# Patient Record
Sex: FEMALE | Race: WHITE | NOT HISPANIC OR LATINO | Employment: OTHER | URBAN - METROPOLITAN AREA
[De-identification: names, ages, dates, MRNs, and addresses within clinical notes are randomized per-mention and may not be internally consistent; named-entity substitution may affect disease eponyms.]

---

## 2017-08-29 ENCOUNTER — LAB (OUTPATIENT)
Dept: LAB | Facility: HOSPITAL | Age: 57
End: 2017-08-29

## 2017-08-29 ENCOUNTER — TRANSCRIBE ORDERS (OUTPATIENT)
Dept: ADMINISTRATIVE | Facility: HOSPITAL | Age: 57
End: 2017-08-29

## 2017-08-29 DIAGNOSIS — A49.02 INFECTION WITH METHICILLIN-RESISTANT STAPHYLOCOCCUS AUREUS (MRSA): Primary | ICD-10-CM

## 2017-08-29 DIAGNOSIS — Z01.818 PRE-OP TESTING: ICD-10-CM

## 2017-08-29 LAB
ALBUMIN SERPL-MCNC: 4 G/DL (ref 3.5–5.2)
ALBUMIN/GLOB SERPL: 1.3 G/DL
ALP SERPL-CCNC: 238 U/L (ref 39–117)
ALT SERPL W P-5'-P-CCNC: 149 U/L (ref 1–33)
ANION GAP SERPL CALCULATED.3IONS-SCNC: 11.3 MMOL/L
AST SERPL-CCNC: 163 U/L (ref 1–32)
BASOPHILS # BLD AUTO: 0.01 10*3/MM3 (ref 0–0.2)
BASOPHILS NFR BLD AUTO: 0.3 % (ref 0–1.5)
BILIRUB SERPL-MCNC: 4.4 MG/DL (ref 0.1–1.2)
BUN BLD-MCNC: 19 MG/DL (ref 6–20)
BUN/CREAT SERPL: 17.9 (ref 7–25)
CALCIUM SPEC-SCNC: 9.4 MG/DL (ref 8.6–10.5)
CHLORIDE SERPL-SCNC: 100 MMOL/L (ref 98–107)
CO2 SERPL-SCNC: 27.7 MMOL/L (ref 22–29)
CREAT BLD-MCNC: 1.06 MG/DL (ref 0.57–1)
DEPRECATED RDW RBC AUTO: 67.7 FL (ref 37–54)
EOSINOPHIL # BLD AUTO: 0.03 10*3/MM3 (ref 0–0.7)
EOSINOPHIL NFR BLD AUTO: 0.9 % (ref 0.3–6.2)
ERYTHROCYTE [DISTWIDTH] IN BLOOD BY AUTOMATED COUNT: 18.9 % (ref 11.7–13)
GFR SERPL CREATININE-BSD FRML MDRD: 54 ML/MIN/1.73
GLOBULIN UR ELPH-MCNC: 3.1 GM/DL
GLUCOSE BLD-MCNC: 144 MG/DL (ref 65–99)
HCT VFR BLD AUTO: 31.5 % (ref 35.6–45.5)
HGB BLD-MCNC: 10 G/DL (ref 11.9–15.5)
IGG1 SER-MCNC: 1087 MG/DL (ref 700–1600)
IMM GRANULOCYTES # BLD: 0 10*3/MM3 (ref 0–0.03)
IMM GRANULOCYTES NFR BLD: 0 % (ref 0–0.5)
INR PPP: 1.05 (ref 0.9–1.1)
LYMPHOCYTES # BLD AUTO: 1.33 10*3/MM3 (ref 0.9–4.8)
LYMPHOCYTES NFR BLD AUTO: 38.8 % (ref 19.6–45.3)
MCH RBC QN AUTO: 31.4 PG (ref 26.9–32)
MCHC RBC AUTO-ENTMCNC: 31.7 G/DL (ref 32.4–36.3)
MCV RBC AUTO: 99.1 FL (ref 80.5–98.2)
MONOCYTES # BLD AUTO: 0.36 10*3/MM3 (ref 0.2–1.2)
MONOCYTES NFR BLD AUTO: 10.5 % (ref 5–12)
NEUTROPHILS # BLD AUTO: 1.7 10*3/MM3 (ref 1.9–8.1)
NEUTROPHILS NFR BLD AUTO: 49.5 % (ref 42.7–76)
PLATELET # BLD AUTO: 147 10*3/MM3 (ref 140–500)
PMV BLD AUTO: 10 FL (ref 6–12)
POTASSIUM BLD-SCNC: 3.9 MMOL/L (ref 3.5–5.2)
PROT SERPL-MCNC: 7.1 G/DL (ref 6–8.5)
PROTHROMBIN TIME: 13.3 SECONDS (ref 11.7–14.2)
RBC # BLD AUTO: 3.18 10*6/MM3 (ref 3.9–5.2)
SODIUM BLD-SCNC: 139 MMOL/L (ref 136–145)
WBC NRBC COR # BLD: 3.43 10*3/MM3 (ref 4.5–10.7)

## 2017-08-29 PROCEDURE — 85025 COMPLETE CBC W/AUTO DIFF WBC: CPT

## 2017-08-29 PROCEDURE — 36415 COLL VENOUS BLD VENIPUNCTURE: CPT

## 2017-08-29 PROCEDURE — 80053 COMPREHEN METABOLIC PANEL: CPT

## 2017-08-29 PROCEDURE — 80158 DRUG ASSAY CYCLOSPORINE: CPT

## 2017-08-29 PROCEDURE — 85610 PROTHROMBIN TIME: CPT

## 2017-08-30 LAB — CYCLOSPORINE BLD LC/MS/MS-MCNC: NORMAL UG/L

## 2024-07-31 ENCOUNTER — DOCUMENTATION (OUTPATIENT)
Dept: FAMILY MEDICINE CLINIC | Facility: CLINIC | Age: 64
End: 2024-07-31
Payer: MEDICARE

## 2024-07-31 NOTE — PROGRESS NOTES
Nursing Home History and Physical       Mike Garcia DO  [address] [place] [address] Phone: [phone]  Fax: [phone]     PATIENT NAME: Cinthia Gerard                                                                          YOB: 1960           DATE OF SERVICE: 2024  FACILITY:  [facility]    CHIEF COMPLAINT:  Nursing facility admission      History of Present Illness  The patient is a 63-year-old female with a history of autoimmune hepatitis status post transplant, osteoporosis, atrial fibrillation, and recent diarrhea. She was admitted to [facility] for concerns of atrial fibrillation with rapid ventricular response (RVR), hypotension due to MRSA bacteremia of uncertain source.    She required an extensive hospitalization, including an ICU stay requiring pressors. She was on broad-spectrum antibiotics, but this was switched to daptomycin and ceftaroline. An extensive workup was conducted in the hospital to identify the source of her bacteremia, but no specific source was identified. Infectious Disease Services recommended an extended period of IV antibiotics with daptomycin to be continued until 2024.       PAST MEDICAL & SURGICAL HISTORY:   Past Medical History:   Diagnosis Date    Acute rejection of liver transplant     GWENDOLYN (acute kidney injury)     Anemia     Anxiety     Atrial fibrillation     Atrial flutter     Autoimmune hepatitis     Avascular necrosis     Bacteremia     Cirrhosis     Closed right hip fracture, with routine healing, subsequent encounter     Diabetes mellitus     Elevated LFTs     Endometriosis     GERD (gastroesophageal reflux disease)     Hepatocellular injury     History of spontaneous      HSV-1 (herpes simplex virus 1) infection     Hypertension     Hypotension     Jaundice     Liver transplant recipient     Low back pain     Mixed hyperlipidemia     MRSA (methicillin resistant Staphylococcus aureus)     Osteopenia      Osteoporosis     Pain in unspecified hip     Pancytopenia     Personal history of diseases of the blood and blood-forming organs and certain disorders involving the immune mechanism     Primary osteoarthritis of both knees     Sacral wounds, chronic     Septic shock     Severe obesity     Shock     Spondylosis of lumbar spine     Steroid-induced avascular necrosis of right shoulder     Subclinical hypothyroidism     Urinary tract infection       Past Surgical History:   Procedure Laterality Date    ABLATION OF DYSRHYTHMIC FOCUS      CARDIAC PACEMAKER PLACEMENT      CENTRAL VENOUS LINE INSERTION  07/2024    CHOLECYSTECTOMY      HIP FRACTURE SURGERY      HYSTERECTOMY      INTRAOCULAR LENS INSERTION      LIVER TRANSPLANTATION  2014    TONSILLECTOMY      TOTAL HIP ARTHROPLASTY Right          MEDICATIONS:  I have reviewed and reconciled the patients medication list in the patients chart at the [facility] nursing [facility] on 08/01/2024.      ALLERGIES:  Allergies   Allergen Reactions    Ampicillin Unknown - High Severity     RASH    Erythromycin Unknown - High Severity     RASH    Gadolinium Derivatives Unknown - High Severity    Latex Unknown - High Severity    Metronidazole Unknown - High Severity     RASH    Tylenol [Acetaminophen] Unknown - High Severity     LIVER ISSUES         SOCIAL HISTORY:  Social History     Socioeconomic History    Marital status:    Tobacco Use    Smoking status: Never    Smokeless tobacco: Never   Vaping Use    Vaping status: Never Used   Substance and Sexual Activity    Drug use: Not Currently    Sexual activity: Defer       FAMILY HISTORY:  Family History   Problem Relation Age of Onset    Arthritis Mother     Hyperlipidemia Mother     Hypertension Mother     Obesity Mother     Osteoarthritis Mother     Cancer Father     Hyperlipidemia Father     Diabetes Father     Skin cancer Father     Atrial fibrillation Father     Obesity Sister     Skin cancer Sister     Cancer Sister      Hypertension Sister     Diabetes Sister     Allergies Daughter     Asthma Daughter     Allergies Son     Asthma Son     Anesthesia problems Other     Malig Hyperthermia Other         REVIEW OF SYSTEMS:  Review of Systems   Constitutional:  Negative for chills, fatigue and fever.   HENT:  Negative for congestion, ear pain, rhinorrhea, sinus pressure and sore throat.    Eyes:  Negative for visual disturbance.   Respiratory:  Negative for cough, chest tightness, shortness of breath and wheezing.    Cardiovascular:  Negative for chest pain, palpitations and leg swelling.   Gastrointestinal:  Negative for abdominal pain, blood in stool, constipation, diarrhea, nausea and vomiting.   Endocrine: Negative for polydipsia and polyuria.   Genitourinary:  Negative for dysuria and hematuria.   Musculoskeletal:  Negative for arthralgias and back pain.   Skin:  Negative for rash.   Neurological:  Negative for dizziness, light-headedness, numbness and headaches.   Psychiatric/Behavioral:  Negative for dysphoric mood and sleep disturbance. The patient is not nervous/anxious.          PHYSICAL EXAMINATION:   VITAL SIGNS: /76   Pulse 72   Temp 96.9 °F (36.1 °C)   Resp 18   SpO2 93%     Physical Exam  Vitals and nursing note reviewed.   Constitutional:       Appearance: Normal appearance. She is well-developed.   HENT:      Head: Normocephalic and atraumatic.      Nose: Nose normal.      Mouth/Throat:      Mouth: Mucous membranes are moist.      Pharynx: No oropharyngeal exudate.   Eyes:      General: No scleral icterus.     Extraocular Movements: Extraocular movements intact.      Conjunctiva/sclera: Conjunctivae normal.      Pupils: Pupils are equal, round, and reactive to light.   Neck:      Thyroid: No thyromegaly.   Cardiovascular:      Rate and Rhythm: Normal rate. Rhythm irregular.      Heart sounds: Normal heart sounds. No murmur heard.     No friction rub. No gallop.   Pulmonary:      Effort: Pulmonary effort is  normal. No respiratory distress.      Breath sounds: Normal breath sounds. No wheezing.   Abdominal:      General: Bowel sounds are normal. There is no distension.      Palpations: Abdomen is soft.      Tenderness: There is no abdominal tenderness.   Musculoskeletal:         General: No deformity or signs of injury.      Cervical back: Normal range of motion and neck supple.   Lymphadenopathy:      Cervical: No cervical adenopathy.   Skin:     General: Skin is warm and dry.      Findings: No rash.   Neurological:      General: No focal deficit present.      Mental Status: She is alert and oriented to person, place, and time.   Psychiatric:         Mood and Affect: Mood normal.         Behavior: Behavior normal.         RECORDS REVIEW:   Discharge Summary [facility] 7/31/2024    ASSESSMENT   Diagnoses and all orders for this visit:    1. Other cirrhosis of liver (Primary)    2. Liver transplanted    3. Constipation, unspecified constipation type    4. Chronic atrial fibrillation    5. HSV-1 infection    6. Acute kidney injury    7. Wound of sacral region, subsequent encounter    8. Type 2 diabetes mellitus without complication, with long-term current use of insulin    9. Anemia of chronic disease    10. Class 1 obesity due to excess calories with serious comorbidity and body mass index (BMI) of 30.0 to 30.9 in adult        PLAN  Assessment & Plan  1. 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  The patient has known associated transaminitis, hyperbilirubinemia, shari, anemia, pancytopenia, and neutropenia. The patient was not considered to be a candidate for another re-transplant. Prednisolone was continued and neutropenic precautions were advised and removed by 07/31/2024.    2. Constipation and diarrhea.  The patient was having alternating symptoms but seems to be stable on a bowel regimen at this time.    3. Atrial fibrillation.  This is stable. The patient initially required  amiodarone drip for concerns of atrial fibrillation with RVR. This was discontinued and the patient was continued on a regimen of metoprolol and Eliquis.    4. HSV 1 viremia.  The patient has completed 7 days of valacyclovir during hospitalization.    5. Acute kidney injury with baseline CKD stage 4.  Baseline creatinine of 1 upon admission to the hospital, creatinine was as high as 3.57.    6. Chronic sacral wounds.  Continue wound care in nursing facility.    7. Type 2 diabetes mellitus.  Most recent A1c of 7.5. Continue current insulin regimen with insulin glargine 10 units b.i.d. and insulin lispro 8 units three times a day with meals.    8. Chronic anemia.  Hemoglobin upon discharge from hospital was 7.8.    9. Obesity.  Complicates aspects of care and recovery time in nursing facility.       [x]  Discussed Patient in detail with nursing/staff, addressed all needs today.     [x]  Plan of Care Reviewed   [x]  PT/OT Reviewed   [x]  Order Changes  []  Discharge Plans Reviewed  [x]  Advance Directive on file with Nursing Home.   [x]  POA on file with Nursing Home.    [x]  Code Status listed and reviewed.     Mike Garcia DO.  9/11/2024      **Part of this note may be an electronic transcription/translation of spoken language to printed text using the Dragon Dictation System.**    Patient or patient representative verbalized consent for the use of Ambient Listening during the visit with  Mike Garcia DO for chart documentation. 9/11/2024  08:39 EDT

## 2024-08-01 ENCOUNTER — NURSING HOME (OUTPATIENT)
Dept: INTERNAL MEDICINE | Facility: CLINIC | Age: 64
End: 2024-08-01
Payer: MEDICARE

## 2024-08-01 DIAGNOSIS — D63.8 ANEMIA OF CHRONIC DISEASE: ICD-10-CM

## 2024-08-01 DIAGNOSIS — S31.000D WOUND OF SACRAL REGION, SUBSEQUENT ENCOUNTER: ICD-10-CM

## 2024-08-01 DIAGNOSIS — E11.9 TYPE 2 DIABETES MELLITUS WITHOUT COMPLICATION, WITH LONG-TERM CURRENT USE OF INSULIN: ICD-10-CM

## 2024-08-01 DIAGNOSIS — N17.9 ACUTE KIDNEY INJURY: ICD-10-CM

## 2024-08-01 DIAGNOSIS — E66.09 CLASS 1 OBESITY DUE TO EXCESS CALORIES WITH SERIOUS COMORBIDITY AND BODY MASS INDEX (BMI) OF 30.0 TO 30.9 IN ADULT: ICD-10-CM

## 2024-08-01 DIAGNOSIS — K74.69 OTHER CIRRHOSIS OF LIVER: Primary | ICD-10-CM

## 2024-08-01 DIAGNOSIS — K59.00 CONSTIPATION, UNSPECIFIED CONSTIPATION TYPE: ICD-10-CM

## 2024-08-01 DIAGNOSIS — Z94.4 LIVER TRANSPLANTED: ICD-10-CM

## 2024-08-01 DIAGNOSIS — I48.20 CHRONIC ATRIAL FIBRILLATION: ICD-10-CM

## 2024-08-01 DIAGNOSIS — B00.9 HSV-1 INFECTION: ICD-10-CM

## 2024-08-01 DIAGNOSIS — Z79.4 TYPE 2 DIABETES MELLITUS WITHOUT COMPLICATION, WITH LONG-TERM CURRENT USE OF INSULIN: ICD-10-CM

## 2024-08-01 PROCEDURE — 99305 1ST NF CARE MODERATE MDM 35: CPT | Performed by: INTERNAL MEDICINE

## 2024-08-01 RX ORDER — SODIUM CHLORIDE 0.9 % (FLUSH) 0.9 %
3 SYRINGE (ML) INJECTION
COMMUNITY

## 2024-08-01 RX ORDER — METHOCARBAMOL 500 MG/1
500 TABLET, FILM COATED ORAL 2 TIMES DAILY
COMMUNITY

## 2024-08-01 RX ORDER — CYCLOSPORINE 25 MG/1
50 CAPSULE, GELATIN COATED ORAL 2 TIMES DAILY
COMMUNITY

## 2024-08-01 RX ORDER — MYCOPHENOLIC ACID 180 MG/1
180 TABLET, DELAYED RELEASE ORAL 2 TIMES DAILY
COMMUNITY

## 2024-08-01 RX ORDER — METOPROLOL TARTRATE 100 MG/1
100 TABLET ORAL 2 TIMES DAILY
COMMUNITY

## 2024-08-01 RX ORDER — INSULIN GLARGINE 100 [IU]/ML
7 INJECTION, SOLUTION SUBCUTANEOUS DAILY
COMMUNITY

## 2024-08-01 RX ORDER — CALCIUM CARBONATE 300MG(750)
1 TABLET,CHEWABLE ORAL
COMMUNITY

## 2024-08-01 RX ORDER — OXYCODONE HYDROCHLORIDE 5 MG/1
5 TABLET ORAL EVERY 6 HOURS PRN
COMMUNITY

## 2024-08-01 RX ORDER — PANTOPRAZOLE SODIUM 40 MG/1
40 TABLET, DELAYED RELEASE ORAL DAILY
COMMUNITY

## 2024-08-01 RX ORDER — NYSTATIN 100000 [USP'U]/G
1 POWDER TOPICAL 2 TIMES DAILY PRN
COMMUNITY

## 2024-08-01 RX ORDER — ONDANSETRON 4 MG/1
4 TABLET, FILM COATED ORAL EVERY 6 HOURS PRN
COMMUNITY

## 2024-08-01 RX ORDER — URSODIOL 300 MG/1
300 CAPSULE ORAL 2 TIMES DAILY
COMMUNITY

## 2024-08-01 RX ORDER — CETIRIZINE HYDROCHLORIDE 10 MG/1
10 TABLET ORAL DAILY
COMMUNITY

## 2024-08-01 RX ORDER — COLLAGENASE SANTYL 250 [ARB'U]/G
1 OINTMENT TOPICAL DAILY
COMMUNITY

## 2024-08-01 NOTE — LETTER
Nursing Home History and Physical       Mike Garcia DO  [address] [place] [address] Phone: [phone]  Fax: [phone]     PATIENT NAME: Cinthia Gerard                                                                          YOB: 1960           DATE OF SERVICE: 2024  FACILITY:  [facility]    CHIEF COMPLAINT:  Nursing facility admission      History of Present Illness  The patient is a 63-year-old female with a history of autoimmune hepatitis status post transplant, osteoporosis, atrial fibrillation, and recent diarrhea. She was admitted to [facility] for concerns of atrial fibrillation with rapid ventricular response (RVR), hypotension due to MRSA bacteremia of uncertain source.    She required an extensive hospitalization, including an ICU stay requiring pressors. She was on broad-spectrum antibiotics, but this was switched to daptomycin and ceftaroline. An extensive workup was conducted in the hospital to identify the source of her bacteremia, but no specific source was identified. Infectious Disease Services recommended an extended period of IV antibiotics with daptomycin to be continued until 2024.       PAST MEDICAL & SURGICAL HISTORY:   Past Medical History:   Diagnosis Date    Acute rejection of liver transplant     GWENDOLYN (acute kidney injury)     Anemia     Anxiety     Atrial fibrillation     Atrial flutter     Autoimmune hepatitis     Avascular necrosis     Bacteremia     Cirrhosis     Closed right hip fracture, with routine healing, subsequent encounter     Diabetes mellitus     Elevated LFTs     Endometriosis     GERD (gastroesophageal reflux disease)     Hepatocellular injury     History of spontaneous      HSV-1 (herpes simplex virus 1) infection     Hypertension     Hypotension     Jaundice     Liver transplant recipient     Low back pain     Mixed hyperlipidemia     MRSA (methicillin resistant Staphylococcus aureus)     Osteopenia      Osteoporosis     Pain in unspecified hip     Pancytopenia     Personal history of diseases of the blood and blood-forming organs and certain disorders involving the immune mechanism     Primary osteoarthritis of both knees     Sacral wounds, chronic     Septic shock     Severe obesity     Shock     Spondylosis of lumbar spine     Steroid-induced avascular necrosis of right shoulder     Subclinical hypothyroidism     Urinary tract infection       Past Surgical History:   Procedure Laterality Date    ABLATION OF DYSRHYTHMIC FOCUS      CARDIAC PACEMAKER PLACEMENT      CENTRAL VENOUS LINE INSERTION  07/2024    CHOLECYSTECTOMY      HIP FRACTURE SURGERY      HYSTERECTOMY      INTRAOCULAR LENS INSERTION      LIVER TRANSPLANTATION  2014    TONSILLECTOMY      TOTAL HIP ARTHROPLASTY Right          MEDICATIONS:  I have reviewed and reconciled the patients medication list in the patients chart at the [facility] nursing [facility] on 08/01/2024.      ALLERGIES:  Allergies   Allergen Reactions    Ampicillin Unknown - High Severity     RASH    Erythromycin Unknown - High Severity     RASH    Gadolinium Derivatives Unknown - High Severity    Latex Unknown - High Severity    Metronidazole Unknown - High Severity     RASH    Tylenol [Acetaminophen] Unknown - High Severity     LIVER ISSUES         SOCIAL HISTORY:  Social History     Socioeconomic History    Marital status:    Tobacco Use    Smoking status: Never    Smokeless tobacco: Never   Vaping Use    Vaping status: Never Used   Substance and Sexual Activity    Drug use: Not Currently    Sexual activity: Defer       FAMILY HISTORY:  Family History   Problem Relation Age of Onset    Arthritis Mother     Hyperlipidemia Mother     Hypertension Mother     Obesity Mother     Osteoarthritis Mother     Cancer Father     Hyperlipidemia Father     Diabetes Father     Skin cancer Father     Atrial fibrillation Father     Obesity Sister     Skin cancer Sister     Cancer Sister      Hypertension Sister     Diabetes Sister     Allergies Daughter     Asthma Daughter     Allergies Son     Asthma Son     Anesthesia problems Other     Malig Hyperthermia Other         REVIEW OF SYSTEMS:  Review of Systems   Constitutional:  Negative for chills, fatigue and fever.   HENT:  Negative for congestion, ear pain, rhinorrhea, sinus pressure and sore throat.    Eyes:  Negative for visual disturbance.   Respiratory:  Negative for cough, chest tightness, shortness of breath and wheezing.    Cardiovascular:  Negative for chest pain, palpitations and leg swelling.   Gastrointestinal:  Negative for abdominal pain, blood in stool, constipation, diarrhea, nausea and vomiting.   Endocrine: Negative for polydipsia and polyuria.   Genitourinary:  Negative for dysuria and hematuria.   Musculoskeletal:  Negative for arthralgias and back pain.   Skin:  Negative for rash.   Neurological:  Negative for dizziness, light-headedness, numbness and headaches.   Psychiatric/Behavioral:  Negative for dysphoric mood and sleep disturbance. The patient is not nervous/anxious.          PHYSICAL EXAMINATION:   VITAL SIGNS: /76   Pulse 72   Temp 96.9 °F (36.1 °C)   Resp 18   SpO2 93%     Physical Exam  Vitals and nursing note reviewed.   Constitutional:       Appearance: Normal appearance. She is well-developed.   HENT:      Head: Normocephalic and atraumatic.      Nose: Nose normal.      Mouth/Throat:      Mouth: Mucous membranes are moist.      Pharynx: No oropharyngeal exudate.   Eyes:      General: No scleral icterus.     Extraocular Movements: Extraocular movements intact.      Conjunctiva/sclera: Conjunctivae normal.      Pupils: Pupils are equal, round, and reactive to light.   Neck:      Thyroid: No thyromegaly.   Cardiovascular:      Rate and Rhythm: Normal rate. Rhythm irregular.      Heart sounds: Normal heart sounds. No murmur heard.     No friction rub. No gallop.   Pulmonary:      Effort: Pulmonary effort is  normal. No respiratory distress.      Breath sounds: Normal breath sounds. No wheezing.   Abdominal:      General: Bowel sounds are normal. There is no distension.      Palpations: Abdomen is soft.      Tenderness: There is no abdominal tenderness.   Musculoskeletal:         General: No deformity or signs of injury.      Cervical back: Normal range of motion and neck supple.   Lymphadenopathy:      Cervical: No cervical adenopathy.   Skin:     General: Skin is warm and dry.      Findings: No rash.   Neurological:      General: No focal deficit present.      Mental Status: She is alert and oriented to person, place, and time.   Psychiatric:         Mood and Affect: Mood normal.         Behavior: Behavior normal.         RECORDS REVIEW:   Discharge Summary [facility] 7/31/2024    ASSESSMENT   Diagnoses and all orders for this visit:    1. Other cirrhosis of liver (Primary)    2. Liver transplanted    3. Constipation, unspecified constipation type    4. Chronic atrial fibrillation    5. HSV-1 infection    6. Acute kidney injury    7. Wound of sacral region, subsequent encounter    8. Type 2 diabetes mellitus without complication, with long-term current use of insulin    9. Anemia of chronic disease    10. Class 1 obesity due to excess calories with serious comorbidity and body mass index (BMI) of 30.0 to 30.9 in adult        PLAN  Assessment & Plan  1. 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  The patient has known associated transaminitis, hyperbilirubinemia, shari, anemia, pancytopenia, and neutropenia. The patient was not considered to be a candidate for another re-transplant. Prednisolone was continued and neutropenic precautions were advised and removed by 07/31/2024.    2. Constipation and diarrhea.  The patient was having alternating symptoms but seems to be stable on a bowel regimen at this time.    3. Atrial fibrillation.  This is stable. The patient initially required  amiodarone drip for concerns of atrial fibrillation with RVR. This was discontinued and the patient was continued on a regimen of metoprolol and Eliquis.    4. HSV 1 viremia.  The patient has completed 7 days of valacyclovir during hospitalization.    5. Acute kidney injury with baseline CKD stage 4.  Baseline creatinine of 1 upon admission to the hospital, creatinine was as high as 3.57.    6. Chronic sacral wounds.  Continue wound care in nursing facility.    7. Type 2 diabetes mellitus.  Most recent A1c of 7.5. Continue current insulin regimen with insulin glargine 10 units b.i.d. and insulin lispro 8 units three times a day with meals.    8. Chronic anemia.  Hemoglobin upon discharge from hospital was 7.8.    9. Obesity.  Complicates aspects of care and recovery time in nursing facility.       [x]  Discussed Patient in detail with nursing/staff, addressed all needs today.     [x]  Plan of Care Reviewed   [x]  PT/OT Reviewed   [x]  Order Changes  []  Discharge Plans Reviewed  [x]  Advance Directive on file with Nursing Home.   [x]  POA on file with Nursing Home.    [x]  Code Status listed and reviewed.     Mike Garcia DO.  9/11/2024      **Part of this note may be an electronic transcription/translation of spoken language to printed text using the Dragon Dictation System.**    Patient or patient representative verbalized consent for the use of Ambient Listening during the visit with  Mike Garcia DO for chart documentation. 9/11/2024  08:39 EDT

## 2024-08-02 VITALS
HEART RATE: 72 BPM | TEMPERATURE: 96.9 F | OXYGEN SATURATION: 93 % | RESPIRATION RATE: 18 BRPM | SYSTOLIC BLOOD PRESSURE: 130 MMHG | DIASTOLIC BLOOD PRESSURE: 76 MMHG